# Patient Record
Sex: MALE | Race: WHITE | Employment: OTHER | URBAN - METROPOLITAN AREA
[De-identification: names, ages, dates, MRNs, and addresses within clinical notes are randomized per-mention and may not be internally consistent; named-entity substitution may affect disease eponyms.]

---

## 2017-03-06 PROCEDURE — 80158 DRUG ASSAY CYCLOSPORINE: CPT | Performed by: UROLOGY

## 2017-03-06 NOTE — LETTER
March 17, 2017       TO: Levy Lozada  [address] [facility] [address]       Dear ,              Mr. Lozada, Please follow-up with your transplant team regarding this result.  Thank you, David Alvarado.            Resulted Orders   Cyclosporine   Result Value Ref Range    Cyclosporine Last Dose 2030 03/05/17     Cyclosporine Level 77 50 - 400 ug/L      Comment:      Cyclosporine Reference Range   Kidney Transplant   Pediatric                        ug/L     0-3 months post transplant   175-200     3-6 months post transplant   150-175     6-9 months post transplant   125-150     9-12 months post transplant  100-125     >12 months post transplant      Adult     0-3 months post transplant   175-200     3-6 months post transplant   150-200     6-12 months post transplant  125-150     >12 months post transplant      Heart Transplant   Pediatric     0-12 months post transplant  200-250     >12 months post transplant   100-125     EBV PCR >1000 copies/mL         Adult     0-3 months post transplant   150-250     3-6 months post transplant   125-225     6-12 months post transplant  100-200     >12 months post transplant      Heart-Lung Block               200-250   Lung Transplant     0-12 months post transplant  175-225     >12 months post transplant   125-175   Liver Transplant     0-6 months post transplant   150-200     >6 [facility] post transplant    100-150   Pancreas Transplant     0-6 months post transplant   200-250     6-12 months post transplant  150-200     >12 months post transplant   100-150   Bone Marrow Transplant         200-400   This test was developed and its performance characteristics determined by the   [facility],  Special Chemistry Laboratory. It has   not been cleared or approved by the FDA. The laboratory is regulated under   CLIA   as qualified to perform high-complexity testing. This test is used for   clinical    purposes. It should not be regarded as investigational or for research.

## 2017-03-06 NOTE — LETTER
March 16, 2017       TO: Levy Lozada  [address] [facility] [address]       DearMr.Kierra,    We are writing to inform you of your test results.    Test results indicate you may require additional follow up, see comment below.    Resulted Orders   Cyclosporine   Result Value Ref Range    Cyclosporine Last Dose 2030 03/05/17     Cyclosporine Level 77 50 - 400 ug/L      Comment:      Cyclosporine Reference Range   Kidney Transplant   Pediatric                        ug/L     0-3 months post transplant   175-200     3-6 months post transplant   150-175     6-9 months post transplant   125-150     9-12 months post transplant  100-125     >12 months post transplant      Adult     0-3 months post transplant   175-200     3-6 months post transplant   150-200     6-12 months post transplant  125-150     >12 months post transplant      Heart Transplant   Pediatric     0-12 months post transplant  200-250     >12 months post transplant   100-125     EBV PCR >1000 copies/mL         Adult     0-3 months post transplant   150-250     3-6 months post transplant   125-225     6-12 months post transplant  100-200     >12 months post transplant      Heart-Lung Block               200-250   Lung Transplant     0-12 months post transplant  175-225     >12 months post transplant   125-175   Liver Transplant     0-6 months post transplant   150-200     >6 [facility] post transplant    100-150   Pancreas Transplant     0-6 months post transplant   200-250     6-12 months post transplant  150-200     >12 months post transplant   100-150   Bone Marrow Transplant         200-400   This test was developed and its performance characteristics determined by the   [facility],  Special Chemistry Laboratory. It has   not been cleared or approved by the FDA. The laboratory is regulated under   CLIA   as qualified to perform high-complexity testing. This test is used for    clinical   purposes. It should not be regarded as investigational or for research.       Mr. Lozada, Please follow-up with your transplant team regarding this result.  Thank you, David Alvarado.

## 2017-03-07 DIAGNOSIS — Z79.899 ENCOUNTER FOR LONG-TERM CURRENT USE OF MEDICATION: ICD-10-CM

## 2017-03-07 DIAGNOSIS — Z94.0 KIDNEY REPLACED BY TRANSPLANT: ICD-10-CM

## 2017-03-07 DIAGNOSIS — Z48.298 AFTERCARE FOLLOWING ORGAN TRANSPLANT: Primary | ICD-10-CM

## 2017-03-07 LAB
CYCLOSPORINE BLD LC/MS/MS-MCNC: 77 UG/L (ref 50–400)
TME LAST DOSE: NORMAL H

## 2017-03-16 NOTE — PROGRESS NOTES
Mr. Lozada, Please follow-up with your transplant team regarding this result.  Thank you, David Alvarado.

## 2017-07-19 ENCOUNTER — EXTERNAL ORDER RESULTS (OUTPATIENT)
Dept: TRANSPLANT | Facility: CLINIC | Age: 72
End: 2017-07-19

## 2018-02-06 ENCOUNTER — TELEPHONE (OUTPATIENT)
Dept: TRANSPLANT | Facility: CLINIC | Age: 73
End: 2018-02-06

## 2018-02-06 NOTE — TELEPHONE ENCOUNTER
Left message for patient that unknown what his symptoms are and what his labs look like because it's been over 1 year since labs are resulted. Recommended PCP clinic or urgent care.

## 2018-02-06 NOTE — TELEPHONE ENCOUNTER
Patient Call: Transplant Illness  Duration of illness: past 2 weeks  Illness: Diarrhea- wanting to know what he can take- [facility]- [facility] and he will see what we recommend alfonso

## 2018-06-22 DIAGNOSIS — Z48.298 AFTERCARE FOLLOWING ORGAN TRANSPLANT: ICD-10-CM

## 2018-06-22 DIAGNOSIS — Z79.899 ENCOUNTER FOR LONG-TERM CURRENT USE OF MEDICATION: ICD-10-CM

## 2018-06-22 DIAGNOSIS — Z94.0 KIDNEY REPLACED BY TRANSPLANT: ICD-10-CM

## 2018-06-22 PROCEDURE — 80158 DRUG ASSAY CYCLOSPORINE: CPT | Performed by: SURGERY

## 2018-06-24 LAB
CYCLOSPORINE BLD LC/MS/MS-MCNC: 82 UG/L (ref 50–400)
TME LAST DOSE: NORMAL H

## 2018-07-09 PROCEDURE — 80158 DRUG ASSAY CYCLOSPORINE: CPT | Performed by: SURGERY

## 2018-07-10 LAB
CYCLOSPORINE BLD LC/MS/MS-MCNC: 73 UG/L (ref 50–400)
TME LAST DOSE: NORMAL H

## 2018-07-18 DIAGNOSIS — Z48.298 AFTERCARE FOLLOWING ORGAN TRANSPLANT: Primary | ICD-10-CM

## 2018-08-15 ENCOUNTER — TELEPHONE (OUTPATIENT)
Dept: TRANSPLANT | Facility: CLINIC | Age: 73
End: 2018-08-15

## 2018-08-15 NOTE — TELEPHONE ENCOUNTER
Patient Call: General  Route to LPN    Reason for call: Pt is interested in possibly using medical marijuana. Pts neurologist is recommending this as a treatment. Pt is wondering about the concerns regarding using it as a treatment.     Call back needed? Yes    Return Call Needed  Same as documented in contacts section  When to return call?: Greater than one day: Route standard priority

## 2018-08-16 NOTE — TELEPHONE ENCOUNTER
Please call Levy cell # [phone] interested in using medical marijuana.  Concerns about if this treatment may effect his transplant.

## 2018-08-20 NOTE — TELEPHONE ENCOUNTER
Please call Levy cell # [phone] interested in using medical marijuana.  Concerns about if this treatment may effect his transplant.

## 2018-10-10 DIAGNOSIS — Z48.298 AFTERCARE FOLLOWING ORGAN TRANSPLANT: ICD-10-CM

## 2018-10-10 PROCEDURE — 80158 DRUG ASSAY CYCLOSPORINE: CPT | Performed by: SURGERY

## 2018-10-11 LAB
CYCLOSPORINE BLD LC/MS/MS-MCNC: 78 UG/L (ref 50–400)
TME LAST DOSE: NORMAL H

## 2019-01-11 PROCEDURE — 80158 DRUG ASSAY CYCLOSPORINE: CPT | Performed by: INTERNAL MEDICINE

## 2019-01-13 LAB
CYCLOSPORINE BLD LC/MS/MS-MCNC: 62 UG/L (ref 50–400)
TME LAST DOSE: NORMAL H

## 2019-06-13 ENCOUNTER — TRANSFERRED RECORDS (OUTPATIENT)
Dept: HEALTH INFORMATION MANAGEMENT | Facility: CLINIC | Age: 74
End: 2019-06-13

## 2019-06-17 DIAGNOSIS — Z48.298 AFTERCARE FOLLOWING ORGAN TRANSPLANT: ICD-10-CM

## 2019-06-17 LAB
CYCLOSPORINE BLD LC/MS/MS-MCNC: 96 UG/L (ref 50–400)
TME LAST DOSE: NORMAL H

## 2019-06-17 PROCEDURE — 80158 DRUG ASSAY CYCLOSPORINE: CPT | Performed by: SURGERY

## 2019-06-20 ENCOUNTER — TRANSFERRED RECORDS (OUTPATIENT)
Dept: HEALTH INFORMATION MANAGEMENT | Facility: CLINIC | Age: 74
End: 2019-06-20

## 2020-01-22 ENCOUNTER — TRANSFERRED RECORDS (OUTPATIENT)
Dept: HEALTH INFORMATION MANAGEMENT | Facility: CLINIC | Age: 75
End: 2020-01-22

## 2020-03-10 ENCOUNTER — TELEPHONE (OUTPATIENT)
Dept: TRANSPLANT | Facility: CLINIC | Age: 75
End: 2020-03-10

## 2020-03-10 NOTE — TELEPHONE ENCOUNTER
Issue:  Urinalysis positive for Ketones. [facility].    Outcome:  Called Tomás to discuss UA results. No answer. Left a message inquiring if he had been contacted by anyone at [facility] regarding his lab results. Advised him to contact [facility] provider [facility] to discuss his labs if he had not already.

## 2021-08-06 PROCEDURE — 80158 DRUG ASSAY CYCLOSPORINE: CPT

## 2021-08-07 ENCOUNTER — LAB REQUISITION (OUTPATIENT)
Dept: LAB | Facility: CLINIC | Age: 76
End: 2021-08-07

## 2021-08-08 LAB
CYCLOSPORINE BLD LC/MS/MS-MCNC: 113 UG/L (ref 50–400)
TME LAST DOSE: NORMAL H
TME LAST DOSE: NORMAL H

## 2021-09-03 ENCOUNTER — LAB REQUISITION (OUTPATIENT)
Dept: LAB | Facility: CLINIC | Age: 76
End: 2021-09-03

## 2021-09-03 PROCEDURE — 80158 DRUG ASSAY CYCLOSPORINE: CPT

## 2021-09-04 LAB
CYCLOSPORINE BLD LC/MS/MS-MCNC: 597 UG/L (ref 50–400)
TME LAST DOSE: ABNORMAL H
TME LAST DOSE: ABNORMAL H

## 2021-09-07 ENCOUNTER — TELEPHONE (OUTPATIENT)
Dept: TRANSPLANT | Facility: CLINIC | Age: 76
End: 2021-09-07

## 2021-09-07 NOTE — TELEPHONE ENCOUNTER
ISSUE:   on 9/3  Patient follows with outside provider    PLAN:  Confirm if he took his medication prior to labs   Advise he repeat labs ensuring a 12 hour trough   Advise he follow up with managing provider.     OUTCOME:  Spoke with Nghia.  He confirmed he took his medication prior to labs   Will follow up with prescribing doctor to see if a repeat is needed.

## 2021-09-27 PROCEDURE — 80158 DRUG ASSAY CYCLOSPORINE: CPT

## 2021-09-28 ENCOUNTER — LAB REQUISITION (OUTPATIENT)
Dept: LAB | Facility: CLINIC | Age: 76
End: 2021-09-28

## 2021-09-28 LAB
CYCLOSPORINE BLD LC/MS/MS-MCNC: 90 UG/L (ref 50–400)
TME LAST DOSE: NORMAL H
TME LAST DOSE: NORMAL H

## 2021-11-30 ENCOUNTER — LAB REQUISITION (OUTPATIENT)
Dept: LAB | Facility: CLINIC | Age: 76
End: 2021-11-30

## 2021-11-30 PROCEDURE — 80158 DRUG ASSAY CYCLOSPORINE: CPT

## 2021-12-01 LAB
CYCLOSPORINE BLD LC/MS/MS-MCNC: 114 UG/L (ref 50–400)
TME LAST DOSE: NORMAL H
TME LAST DOSE: NORMAL H

## 2021-12-09 ENCOUNTER — LAB REQUISITION (OUTPATIENT)
Dept: LAB | Facility: CLINIC | Age: 76
End: 2021-12-09

## 2021-12-09 PROCEDURE — 80158 DRUG ASSAY CYCLOSPORINE: CPT

## 2021-12-10 LAB
CYCLOSPORINE BLD LC/MS/MS-MCNC: 65 UG/L (ref 50–400)
TME LAST DOSE: NORMAL H
TME LAST DOSE: NORMAL H

## 2022-02-01 ENCOUNTER — LAB REQUISITION (OUTPATIENT)
Dept: LAB | Facility: CLINIC | Age: 77
End: 2022-02-01

## 2022-02-01 PROCEDURE — 80158 DRUG ASSAY CYCLOSPORINE: CPT

## 2022-02-02 LAB
CYCLOSPORINE BLD LC/MS/MS-MCNC: 66 UG/L (ref 50–400)
TME LAST DOSE: NORMAL H
TME LAST DOSE: NORMAL H

## 2022-03-07 ENCOUNTER — LAB REQUISITION (OUTPATIENT)
Dept: LAB | Facility: CLINIC | Age: 77
End: 2022-03-07

## 2022-03-07 PROCEDURE — 80197 ASSAY OF TACROLIMUS: CPT

## 2022-03-08 LAB
TACROLIMUS BLD-MCNC: <3 UG/L (ref 5–15)
TME LAST DOSE: ABNORMAL H
TME LAST DOSE: ABNORMAL H

## 2022-05-03 PROCEDURE — 80158 DRUG ASSAY CYCLOSPORINE: CPT

## 2022-05-05 ENCOUNTER — LAB REQUISITION (OUTPATIENT)
Dept: LAB | Facility: CLINIC | Age: 77
End: 2022-05-05

## 2022-05-06 LAB
CYCLOSPORINE BLD LC/MS/MS-MCNC: 57 UG/L (ref 50–400)
TME LAST DOSE: NORMAL H
TME LAST DOSE: NORMAL H

## 2022-08-08 ENCOUNTER — LAB REQUISITION (OUTPATIENT)
Dept: LAB | Facility: CLINIC | Age: 77
End: 2022-08-08

## 2022-08-08 PROCEDURE — 80158 DRUG ASSAY CYCLOSPORINE: CPT

## 2022-08-10 LAB
CYCLOSPORINE BLD LC/MS/MS-MCNC: 48 UG/L (ref 50–400)
TME LAST DOSE: ABNORMAL H
TME LAST DOSE: ABNORMAL H

## 2022-09-01 ENCOUNTER — LAB REQUISITION (OUTPATIENT)
Dept: LAB | Facility: CLINIC | Age: 77
End: 2022-09-01

## 2022-09-01 PROCEDURE — 80158 DRUG ASSAY CYCLOSPORINE: CPT

## 2022-10-28 ENCOUNTER — LAB REQUISITION (OUTPATIENT)
Dept: LAB | Facility: CLINIC | Age: 77
End: 2022-10-28

## 2022-10-28 PROCEDURE — 80158 DRUG ASSAY CYCLOSPORINE: CPT

## 2022-10-31 LAB
CYCLOSPORINE BLD LC/MS/MS-MCNC: 86 UG/L (ref 50–400)
TME LAST DOSE: NORMAL H
TME LAST DOSE: NORMAL H

## 2023-02-08 ENCOUNTER — LAB REQUISITION (OUTPATIENT)
Dept: LAB | Facility: CLINIC | Age: 78
End: 2023-02-08

## 2023-02-08 PROCEDURE — 80158 DRUG ASSAY CYCLOSPORINE: CPT

## 2023-02-09 LAB
CYCLOSPORINE BLD LC/MS/MS-MCNC: 97 UG/L (ref 50–400)
TME LAST DOSE: NORMAL H
TME LAST DOSE: NORMAL H

## 2023-04-18 ENCOUNTER — LAB REQUISITION (OUTPATIENT)
Dept: LAB | Facility: CLINIC | Age: 78
End: 2023-04-18

## 2023-04-18 PROCEDURE — 80158 DRUG ASSAY CYCLOSPORINE: CPT

## 2023-04-19 LAB
CYCLOSPORINE BLD LC/MS/MS-MCNC: 116 UG/L (ref 50–400)
TME LAST DOSE: NORMAL H
TME LAST DOSE: NORMAL H

## 2023-07-10 ENCOUNTER — LAB REQUISITION (OUTPATIENT)
Dept: LAB | Facility: CLINIC | Age: 78
End: 2023-07-10

## 2023-07-10 PROCEDURE — 80158 DRUG ASSAY CYCLOSPORINE: CPT

## 2023-07-11 LAB
CYCLOSPORINE BLD LC/MS/MS-MCNC: 72 UG/L (ref 50–400)
TME LAST DOSE: NORMAL H
TME LAST DOSE: NORMAL H

## 2023-09-06 ENCOUNTER — LAB REQUISITION (OUTPATIENT)
Dept: LAB | Facility: CLINIC | Age: 78
End: 2023-09-06

## 2023-09-06 PROCEDURE — 80158 DRUG ASSAY CYCLOSPORINE: CPT

## 2023-09-07 LAB
CYCLOSPORINE BLD LC/MS/MS-MCNC: 214 UG/L (ref 50–400)
TME LAST DOSE: NORMAL H
TME LAST DOSE: NORMAL H

## 2023-11-13 ENCOUNTER — LAB REQUISITION (OUTPATIENT)
Dept: LAB | Facility: CLINIC | Age: 78
End: 2023-11-13

## 2023-11-13 PROCEDURE — 80158 DRUG ASSAY CYCLOSPORINE: CPT

## 2023-11-14 LAB
CYCLOSPORINE BLD LC/MS/MS-MCNC: 111 UG/L (ref 50–400)
TME LAST DOSE: NORMAL H
TME LAST DOSE: NORMAL H

## 2024-03-06 ENCOUNTER — LAB REQUISITION (OUTPATIENT)
Dept: LAB | Facility: CLINIC | Age: 79
End: 2024-03-06

## 2024-03-06 PROCEDURE — 80158 DRUG ASSAY CYCLOSPORINE: CPT

## 2024-03-07 LAB
CYCLOSPORINE BLD LC/MS/MS-MCNC: 83 UG/L (ref 50–400)
TME LAST DOSE: NORMAL H
TME LAST DOSE: NORMAL H

## 2024-06-11 ENCOUNTER — LAB REQUISITION (OUTPATIENT)
Dept: LAB | Facility: CLINIC | Age: 79
End: 2024-06-11

## 2024-06-11 LAB
CYCLOSPORINE BLD LC/MS/MS-MCNC: 252 UG/L (ref 50–400)
TME LAST DOSE: NORMAL H
TME LAST DOSE: NORMAL H

## 2024-06-11 PROCEDURE — 80158 DRUG ASSAY CYCLOSPORINE: CPT

## 2024-09-11 ENCOUNTER — LAB REQUISITION (OUTPATIENT)
Dept: LAB | Facility: CLINIC | Age: 79
End: 2024-09-11

## 2024-09-11 PROCEDURE — 80158 DRUG ASSAY CYCLOSPORINE: CPT

## 2024-09-12 LAB
CYCLOSPORINE BLD LC/MS/MS-MCNC: 63 UG/L (ref 50–400)
TME LAST DOSE: NORMAL H
TME LAST DOSE: NORMAL H

## 2024-11-13 ENCOUNTER — LAB REQUISITION (OUTPATIENT)
Dept: LAB | Facility: CLINIC | Age: 79
End: 2024-11-13

## 2024-11-13 PROCEDURE — 80158 DRUG ASSAY CYCLOSPORINE: CPT

## 2024-11-14 LAB
CYCLOSPORINE BLD LC/MS/MS-MCNC: 75 UG/L (ref 50–400)
TME LAST DOSE: NORMAL H
TME LAST DOSE: NORMAL H

## 2025-01-20 ENCOUNTER — TRANSFERRED RECORDS (OUTPATIENT)
Dept: HEALTH INFORMATION MANAGEMENT | Facility: CLINIC | Age: 80
End: 2025-01-20
Payer: MEDICARE

## 2025-02-11 ENCOUNTER — LAB REQUISITION (OUTPATIENT)
Dept: LAB | Facility: CLINIC | Age: 80
End: 2025-02-11

## 2025-02-11 PROCEDURE — 80158 DRUG ASSAY CYCLOSPORINE: CPT

## 2025-02-12 LAB
CYCLOSPORINE BLD LC/MS/MS-MCNC: 325 UG/L (ref 50–400)
TME LAST DOSE: NORMAL H
TME LAST DOSE: NORMAL H

## 2025-03-12 ENCOUNTER — LAB REQUISITION (OUTPATIENT)
Dept: LAB | Facility: CLINIC | Age: 80
End: 2025-03-12

## 2025-03-12 PROCEDURE — 80158 DRUG ASSAY CYCLOSPORINE: CPT

## 2025-03-13 LAB
CYCLOSPORINE BLD LC/MS/MS-MCNC: 570 UG/L (ref 50–400)
TME LAST DOSE: ABNORMAL H
TME LAST DOSE: ABNORMAL H

## 2025-03-27 ENCOUNTER — TRANSFERRED RECORDS (OUTPATIENT)
Dept: HEALTH INFORMATION MANAGEMENT | Facility: CLINIC | Age: 80
End: 2025-03-27

## 2025-05-07 PROCEDURE — 80158 DRUG ASSAY CYCLOSPORINE: CPT

## 2025-05-08 ENCOUNTER — RESULTS FOLLOW-UP (OUTPATIENT)
Dept: TRANSPLANT | Facility: CLINIC | Age: 80
End: 2025-05-08

## 2025-05-08 ENCOUNTER — LAB REQUISITION (OUTPATIENT)
Dept: LAB | Facility: CLINIC | Age: 80
End: 2025-05-08

## 2025-05-08 LAB
CYCLOSPORINE BLD LC/MS/MS-MCNC: 190 UG/L (ref 50–400)
TME LAST DOSE: NORMAL H
TME LAST DOSE: NORMAL H

## 2025-08-01 ENCOUNTER — LAB REQUISITION (OUTPATIENT)
Dept: LAB | Facility: CLINIC | Age: 80
End: 2025-08-01

## 2025-08-01 PROCEDURE — 80197 ASSAY OF TACROLIMUS: CPT

## 2025-08-04 LAB
TACROLIMUS BLD-MCNC: <1 UG/L (ref 5–15)
TME LAST DOSE: ABNORMAL H
TME LAST DOSE: ABNORMAL H

## 2025-08-08 ENCOUNTER — LAB REQUISITION (OUTPATIENT)
Dept: LAB | Facility: CLINIC | Age: 80
End: 2025-08-08

## 2025-08-08 PROCEDURE — 80158 DRUG ASSAY CYCLOSPORINE: CPT

## 2025-08-09 LAB
CYCLOSPORINE BLD LC/MS/MS-MCNC: 87 UG/L (ref 50–400)
TME LAST DOSE: NORMAL H
TME LAST DOSE: NORMAL H